# Patient Record
Sex: FEMALE | Race: WHITE | Employment: FULL TIME | URBAN - METROPOLITAN AREA
[De-identification: names, ages, dates, MRNs, and addresses within clinical notes are randomized per-mention and may not be internally consistent; named-entity substitution may affect disease eponyms.]

---

## 2024-08-06 ENCOUNTER — OFFICE VISIT (OUTPATIENT)
Age: 49
End: 2024-08-06
Payer: MEDICAID

## 2024-08-06 VITALS
BODY MASS INDEX: 31.78 KG/M2 | DIASTOLIC BLOOD PRESSURE: 99 MMHG | HEIGHT: 58 IN | SYSTOLIC BLOOD PRESSURE: 154 MMHG | WEIGHT: 151.4 LBS

## 2024-08-06 DIAGNOSIS — N92.3 INTERMENSTRUAL BLEEDING: ICD-10-CM

## 2024-08-06 DIAGNOSIS — Z01.419 WELL WOMAN EXAM WITH ROUTINE GYNECOLOGICAL EXAM: Primary | ICD-10-CM

## 2024-08-06 DIAGNOSIS — Z12.31 BREAST CANCER SCREENING BY MAMMOGRAM: ICD-10-CM

## 2024-08-06 PROBLEM — N20.0 KIDNEY STONE: Status: ACTIVE | Noted: 2020-05-15

## 2024-08-06 PROCEDURE — 99386 PREV VISIT NEW AGE 40-64: CPT | Performed by: OBSTETRICS & GYNECOLOGY

## 2024-08-06 RX ORDER — VALACYCLOVIR HYDROCHLORIDE 1 G/1
1000 TABLET, FILM COATED ORAL DAILY
COMMUNITY
Start: 2024-05-21 | End: 2024-08-06 | Stop reason: ALTCHOICE

## 2024-08-06 RX ORDER — METOPROLOL SUCCINATE 25 MG/1
25 TABLET, EXTENDED RELEASE ORAL DAILY
COMMUNITY
Start: 2023-04-05 | End: 2024-08-06 | Stop reason: ALTCHOICE

## 2024-08-06 NOTE — PROGRESS NOTES
Darcy Shipley is a 48 y.o. female returns for an annual exam     Chief Complaint   Patient presents with    Annual Exam       Patient's last menstrual period was 07/21/2024.  Her periods are moderate in flow and usually regular with a 26-32 day interval with 3-7 day duration.  She does not have dysmenorrhea.  Problems: Patient states having bleeding in between cycles.    Birth Control: tubal ligation.  Last Pap: normal obtained several year(s) ago.  She does not have a history of SCOTT 2, 3 or cervical cancer.   Last Mammogram: has never had a mammogram.     Last Bone Density:  not obtained.  Last colonoscopy:  not obtained.      1. Have you been to the ER, urgent care clinic, or hospitalized since your last visit? N/A NP      2. Have you seen or consulted any other health care providers outside of the [facility] since your last visit? N/A NP      Examination chaperoned by NEO RIVERA CMA.

## 2024-08-06 NOTE — PROGRESS NOTES
ANNUAL EXAM AGES 40-64      History:  Darcy Shipley is a 48 y.o. year old No obstetric history on file. White (non-) female   Patient's last menstrual period was 2024.    She presents for her annual checkup.     Patient's last menstrual period was 2024.  Her periods are moderate in flow and usually regular with a 28 day interval with 4 day duration.  She does have dysmenorrhea.  Problems: Patient states having bleeding in between cycles.    Birth Control: tubal ligation.  Last Pap: normal obtained several year(s) ago.  She does not have a history of SCOTT 2, 3 or cervical cancer.   Last Mammogram: has never had a mammogram.     Last Bone Density:  not obtained.  Last colonoscopy:  not obtained.    Problem List:  Patient Active Problem List    Diagnosis Date Noted    Kidney stone 05/15/2020     Past Medical History:   Diagnosis Date    Dysmenorrhea      Past Surgical History:   Procedure Laterality Date     SECTION  07/15/2003     SECTION  2009     SECTION  2009    CHOLECYSTECTOMY  2010    TUBAL LIGATION Bilateral 1990    @CS       OB History    Para Term  AB Living   4 4 4 0 0 4   SAB IAB Ectopic Molar Multiple Live Births   0 0 0 0 0 4      # Outcome Date GA Lbr Tima/2nd Weight Sex Delivery Anes PTL Lv   4 Term 09    M CS-LTranv  Y JUANITO   3 Term 09    M CS-LTranv  Y JUANITO   2 Term 07/15/03    M CS-LTranv  Y JUANITO   1 Term 96 39w0d  2.41 kg (5 lb 5 oz) M Vag-Vacuum EPI  JUANITO     Social History     Socioeconomic History    Marital status:      Spouse name: None    Number of children: 4    Years of education: None    Highest education level: None   Occupational History    Occupation:      Comment: [employer]   Tobacco Use    Smoking status: Never    Smokeless tobacco: Never   Vaping Use    Vaping Use: Never used   Substance and Sexual Activity    Alcohol use: Not Currently    Drug use: Never

## 2024-08-13 LAB
CYTOLOGIST CVX/VAG CYTO: NORMAL
CYTOLOGY CVX/VAG DOC CYTO: NORMAL
CYTOLOGY CVX/VAG DOC THIN PREP: NORMAL
DX ICD CODE: NORMAL
HPV GENOTYPE REFLEX: NORMAL
HPV I/H RISK 4 DNA CVX QL PROBE+SIG AMP: NEGATIVE
Lab: NORMAL
OTHER STN SPEC: NORMAL
STAT OF ADQ CVX/VAG CYTO-IMP: NORMAL

## 2024-08-22 DIAGNOSIS — N92.3 INTERMENSTRUAL BLEEDING: Primary | ICD-10-CM

## 2024-08-23 ENCOUNTER — OFFICE VISIT (OUTPATIENT)
Age: 49
End: 2024-08-23

## 2024-08-23 VITALS
BODY MASS INDEX: 31.78 KG/M2 | SYSTOLIC BLOOD PRESSURE: 143 MMHG | DIASTOLIC BLOOD PRESSURE: 82 MMHG | RESPIRATION RATE: 20 BRPM | HEIGHT: 58 IN | WEIGHT: 151.4 LBS | HEART RATE: 123 BPM

## 2024-08-23 DIAGNOSIS — N85.00 ENDOMETRIAL HYPERPLASIA: ICD-10-CM

## 2024-08-23 DIAGNOSIS — N84.0 ENDOMETRIAL POLYP: ICD-10-CM

## 2024-08-23 DIAGNOSIS — N92.3 INTERMENSTRUAL BLEEDING: Primary | ICD-10-CM

## 2024-08-23 RX ORDER — VALACYCLOVIR HYDROCHLORIDE 1 G/1
1000 TABLET, FILM COATED ORAL DAILY
COMMUNITY
Start: 2024-08-13

## 2024-08-23 NOTE — PROGRESS NOTES
ABNORMAL BLEEDING NOTE    Darcy Shipley is 48 y.o. year old White (non-)  female who presents for abnormal bleeding.  She has been having regular cycles lasting 4-5 days but is having a lot of bleeding between periods.   Recent pap smear was normal.      Ultrasound was done today to evaluate these complaints and shows:    TV AND TA ULTRASOUND PERFORMED DUE TO BODY HABITUS. UTERUS IS ANTEVERTED, NORMAL IN SIZE AND ECHOGENICITY. THE ENDOMETRIUM APPEARS HETEROGENEOUS AND MEASURES 14-15MM IN THICKNESS. BLOODFLOW IS PRESENT. A POLYP OR MASS CANNOT BE RULED OUT. RIGHT OVARY APPEARS WITHIN NORMAL LIMITS. LEFT OVARY APPEARS WITHIN NORMAL LIMITS. A DOMINANT FOLLICLE IS SEEN AND MEASURES 14 X 10MM. NO FREE FLUID SEEN IN THE CDS    Problem List:  Patient Active Problem List    Diagnosis Date Noted    Kidney stone 05/15/2020     Past Medical History:   Diagnosis Date    Dysmenorrhea      Past Surgical History:   Procedure Laterality Date     SECTION  07/15/2003     SECTION  2009     SECTION  2009    CHOLECYSTECTOMY  2010    TUBAL LIGATION Bilateral 1990    @CS       OB History    Para Term  AB Living   4 4 4 0 0 4   SAB IAB Ectopic Molar Multiple Live Births   0 0 0 0 0 4      # Outcome Date GA Lbr Tima/2nd Weight Sex Type Anes PTL Lv   4 Term 09    M CS-LTranv  Y JUANITO   3 Term 09    M CS-LTranv  Y JUANITO   2 Term 07/15/03    M CS-LTranv  Y JUANITO   1 Term 96 39w0d  2.41 kg (5 lb 5 oz) M Vag-Vacuum EPI  JUANITO     Social History     Socioeconomic History    Marital status:     Number of children: 4   Occupational History    Occupation:      Comment: [employer]   Tobacco Use    Smoking status: Never    Smokeless tobacco: Never   Vaping Use    Vaping status: Never Used   Substance and Sexual Activity    Alcohol use: Not Currently    Drug use: Never    Sexual activity: Yes     Partners: Male     Birth 
Darcy Shipley is a 48 y.o. female presents for a problem visit.    Chief Complaint   Patient presents with    Follow-up    Ultrasound     Patient's last menstrual period was 08/14/2024.  Birth Control: tubal ligation.  Last Pap: normal obtained 8/6/2024.        The patient is reporting having: Ultrasound Follow Up for intermenstrual bleeding.  Ultrasound report:  TV AND TA ULTRASOUND PERFORMED DUE TO BODY HABITUS. UTERUS IS ANTEVERTED, NORMAL IN SIZE AND ECHOGENICITY. THE ENDOMETRIUM APPEARS HETEROGENEOUS AND MEASURES 14-15MM IN THICKNESS. BLOODFLOW IS PRESENT. A POLYP OR MASS CANNOT BE RULED OUT. RIGHT OVARY APPEARS WITHIN NORMAL LIMITS. LEFT OVARY APPEARS WITHIN NORMAL LIMITS. A DOMINANT FOLLICLE IS SEEN AND MEASURES 14 X 10MM. NO FREE FLUID SEEN IN THE CDS    1. Have you been to the ER, urgent care clinic, or hospitalized since your last visit? No    2. Have you seen or consulted any other health care providers outside of the [facility] since your last visit? No    Examination chaperoned by NEO RIVERA CMA.  
 used

## 2024-08-27 PROBLEM — N85.00 ENDOMETRIAL HYPERPLASIA: Status: ACTIVE | Noted: 2024-08-27

## 2024-08-27 PROBLEM — N84.0 ENDOMETRIAL POLYP: Status: ACTIVE | Noted: 2024-08-27

## 2024-08-27 PROBLEM — N93.9 ABNORMAL UTERINE BLEEDING: Status: ACTIVE | Noted: 2024-08-27
